# Patient Record
(demographics unavailable — no encounter records)

---

## 2024-11-08 NOTE — PHYSICAL EXAM
[Chaperone Present] : A chaperone was present in the examining room during all aspects of the physical examination [[id number]] : A chaperone was present during the pelvic exam. [FreeTextEntry2] : JOYCE Ibarra [Appropriately responsive] : appropriately responsive [Alert] : alert [No Acute Distress] : no acute distress [Soft] : soft [Non-tender] : non-tender [Non-distended] : non-distended [No Mass] : no mass [Oriented x3] : oriented x3 [Examination Of The Breasts] : a normal appearance [No Masses] : no breast masses were palpable [Labia Majora] : normal [Labia Minora] : normal [No Bleeding] : There was no active vaginal bleeding [Normal] : normal [Tenderness] : nontender [Uterine Adnexae] : non-palpable

## 2024-11-08 NOTE — HISTORY OF PRESENT ILLNESS
[Patient reported mammogram was normal] : Patient reported mammogram was normal [Patient reported breast sonogram was normal] : Patient reported breast sonogram was normal [N] : Patient reports normal menses [Y] : Positive pregnancy history [Menarche Age: ____] : age at menarche was [unfilled] [No] : Patient does not have concerns regarding sex [Currently Active] : currently active [Men] : men [Mammogramdate] : 2024 [BreastSonogramDate] : 2024 [TextBox_25] : CLUSTER OF CYSTS WERE FOUND, WILL BE MOTORIZED AS PER PT [PapSmeardate] : 01/31/23 [TextBox_31] : WNL [GonorrheaDate] : 07/21/23 [TextBox_63] : NEG [ChlamydiaDate] : 07/21/23 [TextBox_68] : NEG [HPVDate] : 01/31/23 [TextBox_78] : NEG [LMPDate] : 10/22/24 [PGHxTotal] : 2 [[facility] MaryvalexFullTerm] : 2 [[facility] Center[facility]Living] : 2 [FreeTextEntry1] : 10/22/24

## 2024-11-08 NOTE — PHYSICAL EXAM
[Chaperone Present] : A chaperone was present in the examining room during all aspects of the physical examination [[id number]] : A chaperone was present during the pelvic exam. [FreeTextEntry2] : JOYCE Ibarra [Appropriately responsive] : appropriately responsive [Alert] : alert [No Acute Distress] : no acute distress [Soft] : soft [Non-tender] : non-tender [Non-distended] : non-distended [No Mass] : no mass [Oriented x3] : oriented x3 [Examination Of The Breasts] : a normal appearance [No Masses] : no breast masses were palpable [Labia Majora] : normal [Labia Minora] : normal [No Bleeding] : There was no active vaginal bleeding [Normal] : normal [Tenderness] : nontender [Uterine Adnexae] : non-palpable

## 2024-11-08 NOTE — HISTORY OF PRESENT ILLNESS
[Patient reported mammogram was normal] : Patient reported mammogram was normal [Patient reported breast sonogram was normal] : Patient reported breast sonogram was normal [N] : Patient reports normal menses [Y] : Positive pregnancy history [Menarche Age: ____] : age at menarche was [unfilled] [No] : Patient does not have concerns regarding sex [Currently Active] : currently active [Men] : men [Mammogramdate] : 2024 [BreastSonogramDate] : 2024 [TextBox_25] : CLUSTER OF CYSTS WERE FOUND, WILL BE MOTORIZED AS PER PT [PapSmeardate] : 01/31/23 [TextBox_31] : WNL [GonorrheaDate] : 07/21/23 [TextBox_63] : NEG [ChlamydiaDate] : 07/21/23 [TextBox_68] : NEG [HPVDate] : 01/31/23 [TextBox_78] : NEG [LMPDate] : 10/22/24 [PGHxTotal] : 2 [[facility]xFullTerm] : 2 [[facility]xLiving] : 2 [FreeTextEntry1] : 10/22/24

## 2024-11-08 NOTE — PLAN
[FreeTextEntry1] : -F/u pap -Rx screening mammogram/BUS provided for 3/2025, will call [facility] for records prior imaging  -Keep schedule colonoscopy apt.  -Recommended dermatology for routine, annual skin survey -Routine physical activity encouraged -RTO one year or sooner PRN for any new problems, questions or concerns

## 2025-06-04 NOTE — HISTORY OF PRESENT ILLNESS
[N] : Patient does not use contraception [Y] : Patient is sexually active [Mammogramdate] : 11/7/24 [TextBox_19] : Br2 [PapSmeardate] : 11/8/24 [TextBox_31] : Neg [GonorrheaDate] : 7/21/23 [TextBox_63] : Neg [ChlamydiaDate] : 7/21/23 [TextBox_68] : Neg [HPVDate] : 11/8/24 [TextBox_78] : Neg [LMPDate] : 5/22/25 [PGHxTotal] : 2 [[facility]xFull[facility]] : 2 [[facility]xLiving] : 2 [FreeTextEntry1] : C/section: 2

## 2025-06-04 NOTE — PLAN
[FreeTextEntry1] : D/w pt to see neurology for H/a that are bothersome after taking Motrin and Excedrin She does not want to start any OCP (Slind) at this time  Pt will keep diary of her menses to see if there really a true relationship of time and occurrence of H/a after menses RTC for follow up or next annual